# Patient Record
Sex: FEMALE | Race: BLACK OR AFRICAN AMERICAN | Employment: OTHER | URBAN - METROPOLITAN AREA
[De-identification: names, ages, dates, MRNs, and addresses within clinical notes are randomized per-mention and may not be internally consistent; named-entity substitution may affect disease eponyms.]

---

## 2017-03-19 ENCOUNTER — ANESTHESIA EVENT (OUTPATIENT)
Dept: SURGERY | Age: 62
End: 2017-03-19
Payer: MEDICAID

## 2017-03-20 ENCOUNTER — ANESTHESIA (OUTPATIENT)
Dept: SURGERY | Age: 62
End: 2017-03-20
Payer: MEDICAID

## 2017-03-20 ENCOUNTER — HOSPITAL ENCOUNTER (OUTPATIENT)
Age: 62
Setting detail: OUTPATIENT SURGERY
Discharge: HOME | End: 2017-03-20
Payer: MEDICAID

## 2017-03-20 VITALS
WEIGHT: 122.31 LBS | OXYGEN SATURATION: 100 % | RESPIRATION RATE: 19 BRPM | TEMPERATURE: 97.8 F | SYSTOLIC BLOOD PRESSURE: 120 MMHG | HEART RATE: 77 BPM | DIASTOLIC BLOOD PRESSURE: 57 MMHG

## 2017-03-20 LAB
ANION GAP BLD CALC-SCNC: 21 MMOL/L (ref 5–15)
ATRIAL RATE: 80 BPM
BUN BLD-MCNC: 63 MG/DL (ref 9–20)
CA-I BLD-MCNC: 1 MMOL/L (ref 1.12–1.32)
CALCULATED P AXIS, ECG09: 56 DEGREES
CALCULATED R AXIS, ECG10: 63 DEGREES
CALCULATED T AXIS, ECG11: -16 DEGREES
CHLORIDE BLD-SCNC: 106 MMOL/L (ref 98–107)
CO2 BLD-SCNC: 23 MMOL/L (ref 21–32)
CREAT BLD-MCNC: 9.5 MG/DL (ref 0.6–1.3)
DIAGNOSIS, 93000: NORMAL
GLUCOSE BLD-MCNC: 90 MG/DL (ref 65–105)
HCT VFR BLD CALC: 37 % (ref 35–47)
HGB BLD-MCNC: 12.6 GM/DL (ref 11.5–16)
P-R INTERVAL, ECG05: 228 MS
POTASSIUM BLD-SCNC: 4.9 MMOL/L (ref 3.5–5.1)
Q-T INTERVAL, ECG07: 430 MS
QRS DURATION, ECG06: 116 MS
QTC CALCULATION (BEZET), ECG08: 495 MS
SERVICE CMNT-IMP: ABNORMAL
SODIUM BLD-SCNC: 144 MMOL/L (ref 136–145)
VENTRICULAR RATE, ECG03: 80 BPM

## 2017-03-20 PROCEDURE — 74011250636 HC RX REV CODE- 250/636

## 2017-03-20 PROCEDURE — 76060000032 HC ANESTHESIA 0.5 TO 1 HR

## 2017-03-20 PROCEDURE — 77030020256 HC SOL INJ NACL 0.9%  500ML

## 2017-03-20 PROCEDURE — 77030003601 HC NDL NRV BLK BBMI -A

## 2017-03-20 PROCEDURE — 77030002986 HC SUT PROL J&J -A

## 2017-03-20 PROCEDURE — 93005 ELECTROCARDIOGRAM TRACING: CPT

## 2017-03-20 PROCEDURE — 80047 BASIC METABLC PNL IONIZED CA: CPT

## 2017-03-20 PROCEDURE — 76210000020 HC REC RM PH II FIRST 0.5 HR

## 2017-03-20 PROCEDURE — 74011000250 HC RX REV CODE- 250

## 2017-03-20 PROCEDURE — 74011250636 HC RX REV CODE- 250/636: Performed by: ANESTHESIOLOGY

## 2017-03-20 PROCEDURE — 77030018846 HC SOL IRR STRL H20 ICUM -A

## 2017-03-20 PROCEDURE — 76210000006 HC OR PH I REC 0.5 TO 1 HR

## 2017-03-20 PROCEDURE — 77030011640 HC PAD GRND REM COVD -A

## 2017-03-20 PROCEDURE — 77030031139 HC SUT VCRL2 J&J -A

## 2017-03-20 PROCEDURE — 77030032490 HC SLV COMPR SCD KNE COVD -B

## 2017-03-20 PROCEDURE — 76010000138 HC OR TIME 0.5 TO 1 HR

## 2017-03-20 RX ORDER — SODIUM CHLORIDE, SODIUM LACTATE, POTASSIUM CHLORIDE, CALCIUM CHLORIDE 600; 310; 30; 20 MG/100ML; MG/100ML; MG/100ML; MG/100ML
75 INJECTION, SOLUTION INTRAVENOUS CONTINUOUS
Status: DISCONTINUED | OUTPATIENT
Start: 2017-03-20 | End: 2017-03-20 | Stop reason: HOSPADM

## 2017-03-20 RX ORDER — ONDANSETRON 2 MG/ML
INJECTION INTRAMUSCULAR; INTRAVENOUS AS NEEDED
Status: DISCONTINUED | OUTPATIENT
Start: 2017-03-20 | End: 2017-03-20 | Stop reason: HOSPADM

## 2017-03-20 RX ORDER — SERTRALINE HYDROCHLORIDE 25 MG/1
12.5 TABLET, FILM COATED ORAL DAILY
COMMUNITY

## 2017-03-20 RX ORDER — METOPROLOL TARTRATE 25 MG/1
25 TABLET, FILM COATED ORAL 2 TIMES DAILY
COMMUNITY

## 2017-03-20 RX ORDER — BACITRACIN 500 [USP'U]/G
OINTMENT TOPICAL DAILY
COMMUNITY

## 2017-03-20 RX ORDER — MIDAZOLAM HYDROCHLORIDE 1 MG/ML
0.5 INJECTION, SOLUTION INTRAMUSCULAR; INTRAVENOUS
Status: DISCONTINUED | OUTPATIENT
Start: 2017-03-20 | End: 2017-03-20 | Stop reason: HOSPADM

## 2017-03-20 RX ORDER — GUAIFENESIN 100 MG/5ML
81 LIQUID (ML) ORAL DAILY
COMMUNITY

## 2017-03-20 RX ORDER — PROPOFOL 10 MG/ML
INJECTION, EMULSION INTRAVENOUS
Status: DISCONTINUED | OUTPATIENT
Start: 2017-03-20 | End: 2017-03-20 | Stop reason: HOSPADM

## 2017-03-20 RX ORDER — GLYCOPYRROLATE 0.2 MG/ML
INJECTION INTRAMUSCULAR; INTRAVENOUS AS NEEDED
Status: DISCONTINUED | OUTPATIENT
Start: 2017-03-20 | End: 2017-03-20 | Stop reason: HOSPADM

## 2017-03-20 RX ORDER — NITROGLYCERIN 0.3 MG/1
0.3 TABLET SUBLINGUAL
COMMUNITY

## 2017-03-20 RX ORDER — HYDROCODONE BITARTRATE AND ACETAMINOPHEN 7.5; 325 MG/1; MG/1
1 TABLET ORAL
Qty: 25 TAB | Refills: 0 | Status: SHIPPED | OUTPATIENT
Start: 2017-03-20

## 2017-03-20 RX ORDER — SODIUM CHLORIDE 0.9 % (FLUSH) 0.9 %
5-10 SYRINGE (ML) INJECTION AS NEEDED
Status: DISCONTINUED | OUTPATIENT
Start: 2017-03-20 | End: 2017-03-20 | Stop reason: HOSPADM

## 2017-03-20 RX ORDER — FAMOTIDINE 20 MG/1
20 TABLET, FILM COATED ORAL 2 TIMES DAILY
COMMUNITY

## 2017-03-20 RX ORDER — DIPHENHYDRAMINE HYDROCHLORIDE 50 MG/ML
12.5 INJECTION, SOLUTION INTRAMUSCULAR; INTRAVENOUS AS NEEDED
Status: DISCONTINUED | OUTPATIENT
Start: 2017-03-20 | End: 2017-03-20 | Stop reason: HOSPADM

## 2017-03-20 RX ORDER — SODIUM CHLORIDE 9 MG/ML
50 INJECTION, SOLUTION INTRAVENOUS CONTINUOUS
Status: DISCONTINUED | OUTPATIENT
Start: 2017-03-20 | End: 2017-03-20 | Stop reason: HOSPADM

## 2017-03-20 RX ORDER — LIDOCAINE HYDROCHLORIDE 10 MG/ML
INJECTION INFILTRATION; PERINEURAL AS NEEDED
Status: DISCONTINUED | OUTPATIENT
Start: 2017-03-20 | End: 2017-03-20 | Stop reason: HOSPADM

## 2017-03-20 RX ORDER — CALCIUM CARBONATE 500(1250)
2 TABLET ORAL DAILY
COMMUNITY

## 2017-03-20 RX ORDER — ACETAMINOPHEN 325 MG/1
650 TABLET ORAL
COMMUNITY

## 2017-03-20 RX ORDER — SODIUM CHLORIDE 9 MG/ML
25 INJECTION, SOLUTION INTRAVENOUS CONTINUOUS
Status: DISCONTINUED | OUTPATIENT
Start: 2017-03-20 | End: 2017-03-20 | Stop reason: HOSPADM

## 2017-03-20 RX ORDER — SODIUM CHLORIDE, SODIUM LACTATE, POTASSIUM CHLORIDE, CALCIUM CHLORIDE 600; 310; 30; 20 MG/100ML; MG/100ML; MG/100ML; MG/100ML
125 INJECTION, SOLUTION INTRAVENOUS CONTINUOUS
Status: DISCONTINUED | OUTPATIENT
Start: 2017-03-20 | End: 2017-03-20 | Stop reason: HOSPADM

## 2017-03-20 RX ORDER — DEXAMETHASONE SODIUM PHOSPHATE 4 MG/ML
INJECTION, SOLUTION INTRA-ARTICULAR; INTRALESIONAL; INTRAMUSCULAR; INTRAVENOUS; SOFT TISSUE AS NEEDED
Status: DISCONTINUED | OUTPATIENT
Start: 2017-03-20 | End: 2017-03-20 | Stop reason: HOSPADM

## 2017-03-20 RX ORDER — LIDOCAINE HYDROCHLORIDE 10 MG/ML
0.1 INJECTION, SOLUTION EPIDURAL; INFILTRATION; INTRACAUDAL; PERINEURAL AS NEEDED
Status: DISCONTINUED | OUTPATIENT
Start: 2017-03-20 | End: 2017-03-20 | Stop reason: HOSPADM

## 2017-03-20 RX ORDER — HYDROCODONE BITARTRATE AND ACETAMINOPHEN 5; 325 MG/1; MG/1
1 TABLET ORAL AS NEEDED
Status: DISCONTINUED | OUTPATIENT
Start: 2017-03-20 | End: 2017-03-20 | Stop reason: HOSPADM

## 2017-03-20 RX ORDER — FENTANYL CITRATE 50 UG/ML
50 INJECTION, SOLUTION INTRAMUSCULAR; INTRAVENOUS AS NEEDED
Status: DISCONTINUED | OUTPATIENT
Start: 2017-03-20 | End: 2017-03-20 | Stop reason: HOSPADM

## 2017-03-20 RX ORDER — SODIUM CHLORIDE 0.9 % (FLUSH) 0.9 %
5-10 SYRINGE (ML) INJECTION EVERY 8 HOURS
Status: DISCONTINUED | OUTPATIENT
Start: 2017-03-20 | End: 2017-03-20 | Stop reason: HOSPADM

## 2017-03-20 RX ORDER — KETOTIFEN FUMARATE 0.35 MG/ML
1 SOLUTION/ DROPS OPHTHALMIC
COMMUNITY

## 2017-03-20 RX ORDER — LEVETIRACETAM 500 MG/1
500 TABLET ORAL 2 TIMES DAILY
COMMUNITY

## 2017-03-20 RX ORDER — PROMETHAZINE HYDROCHLORIDE 12.5 MG/1
TABLET ORAL
COMMUNITY

## 2017-03-20 RX ORDER — HYDROMORPHONE HYDROCHLORIDE 1 MG/ML
0.2 INJECTION, SOLUTION INTRAMUSCULAR; INTRAVENOUS; SUBCUTANEOUS
Status: DISCONTINUED | OUTPATIENT
Start: 2017-03-20 | End: 2017-03-20 | Stop reason: HOSPADM

## 2017-03-20 RX ORDER — AMLODIPINE BESYLATE 5 MG/1
5 TABLET ORAL DAILY
COMMUNITY

## 2017-03-20 RX ORDER — ONDANSETRON 2 MG/ML
4 INJECTION INTRAMUSCULAR; INTRAVENOUS AS NEEDED
Status: DISCONTINUED | OUTPATIENT
Start: 2017-03-20 | End: 2017-03-20 | Stop reason: HOSPADM

## 2017-03-20 RX ORDER — MIDAZOLAM HYDROCHLORIDE 1 MG/ML
1 INJECTION, SOLUTION INTRAMUSCULAR; INTRAVENOUS AS NEEDED
Status: DISCONTINUED | OUTPATIENT
Start: 2017-03-20 | End: 2017-03-20 | Stop reason: HOSPADM

## 2017-03-20 RX ORDER — MORPHINE SULFATE 2 MG/ML
2 INJECTION, SOLUTION INTRAMUSCULAR; INTRAVENOUS
Status: DISCONTINUED | OUTPATIENT
Start: 2017-03-20 | End: 2017-03-20 | Stop reason: HOSPADM

## 2017-03-20 RX ORDER — FENTANYL CITRATE 50 UG/ML
25 INJECTION, SOLUTION INTRAMUSCULAR; INTRAVENOUS
Status: DISCONTINUED | OUTPATIENT
Start: 2017-03-20 | End: 2017-03-20 | Stop reason: HOSPADM

## 2017-03-20 RX ORDER — SEVELAMER CARBONATE FOR ORAL SUSPENSION 800 MG/1
1.6 POWDER, FOR SUSPENSION ORAL
COMMUNITY

## 2017-03-20 RX ORDER — BUDESONIDE 1 MG/2ML
1000 INHALANT ORAL
COMMUNITY

## 2017-03-20 RX ORDER — SODIUM CHLORIDE 9 MG/ML
INJECTION, SOLUTION INTRAVENOUS
Status: DISCONTINUED | OUTPATIENT
Start: 2017-03-20 | End: 2017-03-20 | Stop reason: HOSPADM

## 2017-03-20 RX ADMIN — SODIUM CHLORIDE: 9 INJECTION, SOLUTION INTRAVENOUS at 12:47

## 2017-03-20 RX ADMIN — GLYCOPYRROLATE 0.2 MG: 0.2 INJECTION INTRAMUSCULAR; INTRAVENOUS at 12:50

## 2017-03-20 RX ADMIN — SODIUM CHLORIDE 50 ML/HR: 900 INJECTION, SOLUTION INTRAVENOUS at 12:14

## 2017-03-20 RX ADMIN — FENTANYL CITRATE 50 MCG: 50 INJECTION, SOLUTION INTRAMUSCULAR; INTRAVENOUS at 12:19

## 2017-03-20 RX ADMIN — PROPOFOL 20 MCG/KG/MIN: 10 INJECTION, EMULSION INTRAVENOUS at 12:47

## 2017-03-20 RX ADMIN — DEXAMETHASONE SODIUM PHOSPHATE 4 MG: 4 INJECTION, SOLUTION INTRA-ARTICULAR; INTRALESIONAL; INTRAMUSCULAR; INTRAVENOUS; SOFT TISSUE at 12:58

## 2017-03-20 RX ADMIN — ONDANSETRON 4 MG: 2 INJECTION INTRAMUSCULAR; INTRAVENOUS at 12:58

## 2017-03-20 RX ADMIN — MIDAZOLAM HYDROCHLORIDE 1 MG: 1 INJECTION, SOLUTION INTRAMUSCULAR; INTRAVENOUS at 12:19

## 2017-03-20 NOTE — IP AVS SNAPSHOT
Summary of Care Report The Summary of Care report has been created to help improve care coordination. Users with access to [employer] or [address] (Web-based application) may access additional patient information including the Discharge Summary. If you are not currently a [address] user and need more information, please call the number listed below in the [place] [address] section and ask to be connected with Medical Records. Facility Information Name Address Phone [phone] [facility] 7 [phone] [phone] Patient Information Patient Name Sex  Chito Fritz ([id number]) Female 1955 Discharge Information Admitting Provider Service Area Unit Diane Meeks MD / Edward Shaikh / [phone] Discharge Provider Discharge Date/Time Discharge Disposition Destination (none) 3/20/2017 (Pending) SNF (none) Patient Language Language ENGLISH [13] You are allergic to the following Allergen Reactions Tomato Hives Ancef (Cefazolin) Unknown (comments) Nsaids (Non-Steroidal Anti-Inflammatory Drug) Unknown (comments) Current Discharge Medication List  
  
START taking these medications Dose & Instructions Dispensing Information Comments HYDROcodone-acetaminophen 7.5-325 mg per tablet Commonly known as:  Ed Pittman Dose:  1 Tab Take 1 Tab by mouth every four (4) hours as needed for Pain. Max Daily Amount: 6 Tabs. Quantity:  25 Tab Refills:  0 CONTINUE these medications which have NOT CHANGED Dose & Instructions Dispensing Information Comments  
 acetaminophen 325 mg tablet Commonly known as:  TYLENOL Dose:  650 mg Take 650 mg by mouth every four (4) hours as needed for Pain. Indications: Fever, Pain  Refills:  0  
   
 aspirin 81 mg chewable tablet Dose:  81 mg Take 81 mg by mouth daily. Indications: heart failure Refills:  0  
   
 bacitracin 500 unit/gram Oint Commonly known as:  BACITRACIN Apply  to affected area daily. Apply to upper right arm topically every day for altered skin integrity, clean area and apply ointment to area and leave uncovered Refills:  0  
   
 calcium carbonate 500 mg calcium (1,250 mg) tablet Commonly known as:  OS-FABI Dose:  2 Tab Take 2 Tabs by mouth daily. Refills:  0  
   
 famotidine 20 mg tablet Commonly known as:  PEPCID Dose:  20 mg Take 20 mg by mouth two (2) times a day. Indications: GASTROESOPHAGEAL REFLUX Refills:  0 HumuLIN R U-100 100 unit/mL injection Generic drug:  insulin regular  
 by SubCUTAneous route two (2) times a day. Sliding Scale: 0-149=0, 150-199= 2 units, 200-249= 4 units, 250-299= 6 units,  300-349= 8 units, 350-400= 10 units, Greater than 400 call MD, less than 70 call MD  
 Refills:  0 KEPPRA 500 mg tablet Generic drug:  levETIRAcetam  
 Dose:  500 mg Take 500 mg by mouth two (2) times a day. Indications: unspecified convulsion Refills:  0  
   
 ketotifen 0.025 % (0.035 %) ophthalmic solution Commonly known as:  ZADITOR Dose:  1 Drop Administer 1 Drop to both eyes every eight (8) hours as needed. Refills:  0  
   
 metoprolol tartrate 25 mg tablet Commonly known as:  LOPRESSOR Dose:  25 mg Take 25 mg by mouth two (2) times a day. Refills:  0  
   
 nitroglycerin 0.3 mg SL tablet Commonly known as:  NITROSTAT Dose:  0.3 mg  
0.3 mg by SubLINGual route every five (5) minutes as needed for Chest Pain. Refills:  0 NORVASC 5 mg tablet Generic drug:  amLODIPine Dose:  5 mg Take 5 mg by mouth daily. Refills:  0  
   
 promethazine 12.5 mg tablet Commonly known as:  PHENERGAN Take  by mouth every six (6) hours as needed for Nausea. Refills:  0 PULMICORT 1 mg/2 mL Nbsp Generic drug: budesonide Dose:  1000 mcg  
1,000 mcg by Nebulization route every twelve (12) hours as needed (for SOB). Refills:  0  
   
 sertraline 25 mg tablet Commonly known as:  ZOLOFT Dose:  12.5 mg Take 12.5 mg by mouth daily. Indications: depression Refills:  0  
   
 sevelamer carbonate 0.8 gram Pwpk oral powder Commonly known as:  Ehsan Beau Dose:  1.6 g Take 1.6 g by mouth Every Tuesday, Thursday and Saturday. Refills:  0 Surgery Information ID Date/Time Status Primary Surgeon All Procedures Location [id number] 3/20/2017 Scarlet Ríos MD REPAIR ANEURYSM RIGHT ARM FISTULA  [facility] MAIN OR Follow-up Information Follow up With Details Comments Contact Info None   None (395) Patient stated that they have no PCP Discharge Instructions Patient Discharge Instructions Trinidad Judy / [id number] : 1955 Admitted 3/20/2017 Discharged: 3/20/2017 Take Home Medications · It is important that you take the medication exactly as they are prescribed. · Keep your medication in the bottles provided by the pharmacist and keep a list of the medication names, dosages, and times to be taken in your wallet. · Do not take other medications without consulting your doctor. What to do at [facility] Recommended diet: Renal Diet, Recommended activity: Activity as tolerated, Additional Instructions: remove arm dressing on Wed and leave open Follow-up with Dr Dea Mccracken in 2 weeks, call [phone] for appt Information obtained by : 
I understand that if any problems occur once I am at home I am to contact my physician. I understand and acknowledge receipt of the instructions indicated above.   
 
                                                                                                                                     
Physician's or R.N.'s Signature Date/Time Patient or Representative Signature                                                          Date/Time Chart Review Routing History No Routing History on File

## 2017-03-20 NOTE — ANESTHESIA PROCEDURE NOTES
Peripheral Block    Start time: 3/20/2017 12:10 PM  End time: 3/20/2017 12:16 PM  Performed by: Yoselin Fernandez  Authorized by: Yoselin Fernandez       Pre-procedure:    Indications: at surgeon's request and post-op pain management    Preanesthetic Checklist: risks and benefits discussed, site marked and timeout performed      Block Type:   Block Type:  Supraclavicular  Monitoring:  Standard ASA monitoring, continuous pulse ox, frequent vital sign checks, heart rate, responsive to questions and oxygen  Injection Technique:  Single shot  Procedures: ultrasound guided and nerve stimulator    Patient Position: supine  Prep: betadine and povidone-iodine 7.5% surgical scrub    Location:  Supraclavicular  Needle Type:  Stimuplex  Needle Gauge:  22 G  Needle Localization:  Nerve stimulator and ultrasound guidance  Motor Response: minimal motor response >0.4 mA    Medication Injected:  1.5%  ropivacaine and mepivacaine  Volume (mL):  20    Assessment:  Number of attempts:  1  Injection Assessment:  Incremental injection every 5 mL, local visualized surrounding nerve on ultrasound, negative aspiration for blood, no intravascular symptoms, negative aspiration for CSF, no paresthesia and ultrasound image on chart  Patient tolerance:  Patient tolerated the procedure well with no immediate complications

## 2017-03-20 NOTE — DISCHARGE INSTRUCTIONS
Patient Discharge Instructions    Carlos Sharp / [id number] : 1955    Admitted 3/20/2017 Discharged: 3/20/2017     Take Home Medications            · It is important that you take the medication exactly as they are prescribed. · Keep your medication in the bottles provided by the pharmacist and keep a list of the medication names, dosages, and times to be taken in your wallet. · Do not take other medications without consulting your doctor. What to do at Home    Recommended diet: Renal Diet,     Recommended activity: Activity as tolerated,     Additional Instructions: remove arm dressing on Wed and leave open    Follow-up with Dr Dee Wilkins in 2 weeks, call [phone] for appt        Information obtained by :  I understand that if any problems occur once I am at home I am to contact my physician. I understand and acknowledge receipt of the instructions indicated above.                                                                                                                                            Physician's or R.N.'s Signature                                                                  Date/Time                                                                                                                                              Patient or Representative Signature                                                          Date/Time

## 2017-03-20 NOTE — ANESTHESIA POSTPROCEDURE EVALUATION
Post-Anesthesia Evaluation and Assessment    Patient: Alicja Parent MRN: [id number]  SSN: [id number]    YOB: 1955  Age: 64 y.o. Sex: female       Cardiovascular Function/Vital Signs  Visit Vitals    /57    Pulse 77    Temp 36.6 °C (97.8 °F)    Resp 19    Wt 55.5 kg (122 lb 5 oz)    SpO2 100%       Patient is status post general, regional anesthesia for Procedure(s):  REPAIR ANEURYSM RIGHT ARM FISTULA . Nausea/Vomiting: None    Postoperative hydration reviewed and adequate. Pain:  Pain Scale 1: Numeric (0 - 10) (03/20/17 1204)  Pain Intensity 1: 0 (03/20/17 1204)   Managed    Neurological Status: At baseline    Mental Status and Level of Consciousness: Arousable    Pulmonary Status:   O2 Device: Nasal cannula (03/20/17 1346)   Adequate oxygenation and airway patent    Complications related to anesthesia: None    Post-anesthesia assessment completed.  No concerns    Signed By: Silvano Franklin MD     March 20, 2017

## 2017-03-20 NOTE — PERIOP NOTES
Report called to [facility] facility regarding pt.'s discharge instructions and prescription. Patient awaiting medical transport back to the facility.

## 2017-03-20 NOTE — ANESTHESIA PREPROCEDURE EVALUATION
Anesthetic History   No history of anesthetic complications            Review of Systems / Medical History  Patient summary reviewed, nursing notes reviewed and pertinent labs reviewed    Pulmonary  Within defined limits                 Neuro/Psych   Within defined limits           Cardiovascular  Within defined limits  Hypertension          CAD         GI/Hepatic/Renal  Within defined limits              Endo/Other  Within defined limits  Diabetes         Other Findings              Physical Exam    Airway  Mallampati: II  TM Distance: > 6 cm  Neck ROM: normal range of motion   Mouth opening: Normal     Cardiovascular  Regular rate and rhythm,  S1 and S2 normal,  no murmur, click, rub, or gallop             Dental  No notable dental hx       Pulmonary  Breath sounds clear to auscultation               Abdominal  GI exam deferred       Other Findings            Anesthetic Plan    ASA: 3  Anesthesia type: general and regional - supraclavicular block

## 2017-03-20 NOTE — IP AVS SNAPSHOT
[address] 
[phone] Patient: David Donahue MRN: [id number] XEC:[id number] You are allergic to the following Allergen Reactions Tomato Hives Ancef (Cefazolin) Unknown (comments) Nsaids (Non-Steroidal Anti-Inflammatory Drug) Unknown (comments) Recent Documentation Weight OB Status Smoking Status 55.5 kg Hysterectomy Never Smoker About your hospitalization You were admitted on:  March 20, 2017 You last received care in the:  [facility] PACU You were discharged on:  March 20, 2017 Unit phone number:  [phone] Why you were hospitalized Your primary diagnosis was:  Not on File Providers Seen During Your Hospitalizations Provider Role Specialty Primary office phone Lucrecia Rosa MD Attending Provider Vascular Surgery [phone] Your Primary Care Physician (PCP) Primary Care Physician Office Phone Office Fax NONE ** None ** ** None ** Follow-up Information Follow up With Details Comments Contact Info None   None (395) Patient stated that they have no PCP Current Discharge Medication List  
  
START taking these medications Dose & Instructions Dispensing Information Comments Morning Noon Evening Bedtime HYDROcodone-acetaminophen 7.5-325 mg per tablet Commonly known as:  Yi Minor Your last dose was: Your next dose is:    
   
   
 Dose:  1 Tab Take 1 Tab by mouth every four (4) hours as needed for Pain. Max Daily Amount: 6 Tabs. Quantity:  25 Tab Refills:  0 CONTINUE these medications which have NOT CHANGED Dose & Instructions Dispensing Information Comments Morning Noon Evening Bedtime  
 acetaminophen 325 mg tablet Commonly known as:  TYLENOL Your last dose was:     
   
Your next dose is:    
   
   
 Dose:  650 mg Take 650 mg by mouth every four (4) hours as needed for Pain. Indications: Fever, Pain Refills:  0  
     
   
   
   
  
 aspirin 81 mg chewable tablet Your last dose was: Your next dose is:    
   
   
 Dose:  81 mg Take 81 mg by mouth daily. Indications: heart failure Refills:  0  
     
   
   
   
  
 bacitracin 500 unit/gram Oint Commonly known as:  BACITRACIN Your last dose was: Your next dose is:    
   
   
 Apply  to affected area daily. Apply to upper right arm topically every day for altered skin integrity, clean area and apply ointment to area and leave uncovered Refills:  0  
     
   
   
   
  
 calcium carbonate 500 mg calcium (1,250 mg) tablet Commonly known as:  OS-FABI Your last dose was: Your next dose is:    
   
   
 Dose:  2 Tab Take 2 Tabs by mouth daily. Refills:  0  
     
   
   
   
  
 famotidine 20 mg tablet Commonly known as:  PEPCID Your last dose was: Your next dose is:    
   
   
 Dose:  20 mg Take 20 mg by mouth two (2) times a day. Indications: GASTROESOPHAGEAL REFLUX Refills:  0 HumuLIN R U-100 100 unit/mL injection Generic drug:  insulin regular Your last dose was: Your next dose is:    
   
   
 by SubCUTAneous route two (2) times a day. Sliding Scale: 0-149=0, 150-199= 2 units, 200-249= 4 units, 250-299= 6 units,  300-349= 8 units, 350-400= 10 units, Greater than 400 call MD, less than 70 call MD  
 Refills:  0 KEPPRA 500 mg tablet Generic drug:  levETIRAcetam  
   
Your last dose was: Your next dose is:    
   
   
 Dose:  500 mg Take 500 mg by mouth two (2) times a day. Indications: unspecified convulsion Refills:  0  
     
   
   
   
  
 ketotifen 0.025 % (0.035 %) ophthalmic solution Commonly known as:  ZADITOR Your last dose was: Your next dose is:    
   
   
 Dose:  1 Drop Administer 1 Drop to both eyes every eight (8) hours as needed. Refills:  0  
     
   
   
   
  
 metoprolol tartrate 25 mg tablet Commonly known as:  LOPRESSOR Your last dose was: Your next dose is:    
   
   
 Dose:  25 mg Take 25 mg by mouth two (2) times a day. Refills:  0  
     
   
   
   
  
 nitroglycerin 0.3 mg SL tablet Commonly known as:  NITROSTAT Your last dose was: Your next dose is:    
   
   
 Dose:  0.3 mg  
0.3 mg by SubLINGual route every five (5) minutes as needed for Chest Pain. Refills:  0 NORVASC 5 mg tablet Generic drug:  amLODIPine Your last dose was: Your next dose is:    
   
   
 Dose:  5 mg Take 5 mg by mouth daily. Refills:  0  
     
   
   
   
  
 promethazine 12.5 mg tablet Commonly known as:  PHENERGAN Your last dose was: Your next dose is: Take  by mouth every six (6) hours as needed for Nausea. Refills:  0 PULMICORT 1 mg/2 mL Nbsp Generic drug:  budesonide Your last dose was: Your next dose is:    
   
   
 Dose:  1000 mcg  
1,000 mcg by Nebulization route every twelve (12) hours as needed (for SOB). Refills:  0  
     
   
   
   
  
 sertraline 25 mg tablet Commonly known as:  ZOLOFT Your last dose was: Your next dose is:    
   
   
 Dose:  12.5 mg Take 12.5 mg by mouth daily. Indications: depression Refills:  0  
     
   
   
   
  
 sevelamer carbonate 0.8 gram Pwpk oral powder Commonly known as:  Brian De La Garza Your last dose was: Your next dose is:    
   
   
 Dose:  1.6 g Take 1.6 g by mouth Every Tuesday, Thursday and Saturday. Refills:  0 Where to Get Your Medications Information on where to get these meds will be given to you by the nurse or doctor. ! Ask your nurse or doctor about these medications   HYDROcodone-acetaminophen 7.5-325 mg per tablet Discharge Instructions Patient Discharge Instructions Merline Holt / [id number] : 1955 Admitted 3/20/2017 Discharged: 3/20/2017 Take Home Medications · It is important that you take the medication exactly as they are prescribed. · Keep your medication in the bottles provided by the pharmacist and keep a list of the medication names, dosages, and times to be taken in your wallet. · Do not take other medications without consulting your doctor. What to do at [facility] Recommended diet: Renal Diet, Recommended activity: Activity as tolerated, Additional Instructions: remove arm dressing on Wed and leave open Follow-up with Dr Moni Ragsdale in 2 weeks, call [phone] for appt Information obtained by : 
I understand that if any problems occur once I am at home I am to contact my physician. I understand and acknowledge receipt of the instructions indicated above. Physician's or R.N.'s Signature                                                                  Date/Time Patient or Representative Signature                                                          Date/Time Discharge Orders None Introducing [facility]! [employer] introduces [employer] patient portal. Now you can access parts of your medical record, email your doctor's office, and request medication refills online. 1. In your internet browser, go to [email or web address] 2. Click on the First Time User? Click Here link in the Sign In box. You will see the New Member Sign Up page. 3. Enter your [employer] Access Code exactly as it appears below.  You will not need to use this code after youve completed the sign-up process. If you do not sign up before the expiration date, you must request a new code. · [employer] Access Code: [email or web address] Expires: 6/13/2017 10:32 AM 
 
4. Enter the last four digits of your Social Security Number (xxxx) and Date of Birth (mm/dd/yyyy) as indicated and click Submit. You will be taken to the next sign-up page. 5. Create a [employer] ID. This will be your [employer] login ID and cannot be changed, so think of one that is secure and easy to remember. 6. Create a [employer] password. You can change your password at any time. 7. Enter your Password Reset Question and Answer. This can be used at a later time if you forget your password. 8. Enter your e-mail address. You will receive e-mail notification when new information is available in [address] 9. Click Sign Up. You can now view and download portions of your medical record. 10. Click the Download Summary menu link to download a portable copy of your medical information. If you have questions, please visit the Frequently Asked Questions section of the [employer] website. Remember, [employer] is NOT to be used for urgent needs. For medical emergencies, dial 911. Now available from your iPhone and Android! General Information Please provide this summary of care documentation to your next provider. Patient Signature:  ____________________________________________________________ Date:  ____________________________________________________________  
  
Mata Cristobal Provider Signature:  ____________________________________________________________ Date:  ____________________________________________________________

## 2017-03-20 NOTE — OP NOTES
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 PROCEDURES PERFORMED: Revision right upper arm fistula with   repair of aneurysm. SURGEON: Calixto Pope MD.    ANESTHESIA: Regional block. ESTIMATED BLOOD LOSS: Minimal.    SPECIMENS REMOVED: None. COMPLICATIONS: None. INDICATIONS: The patient is a 35-year-old female with end-stage   renal disease on hemodialysis. She has a functioning right upper arm   fistula. The arterial end of the fistula is dilated and aneurysmal with   compromise of the overlying skin. This portion of the fistula will be   excised and repaired. She has a tunneled catheter that is being used   for dialysis. DESCRIPTION OF PROCEDURE: The patient's right arm was   prepped and draped. A longitudinal incision was made over the   aneurysmal portion of the fistula to include excision of pink thin-walled   section of the skin with an overlying scabbed area. At the arterial end   of the fistula, the fistula was dissected free down to more normal   caliber vessel. It was encircled with a vessel loop. Dissection was then   continued in the mid portion of the fistula at the outflow end of the   aneurysmal segment. The fistula was encircled at this level. It was then   clamped at both ends and opened longitudinally. Prior to this, the   fistula had been dissected free, nearly circumferentially except for the   posterior portion. A significant portion of the circumference of the   fistula was excised and the fistula was reapproximated basically doing   an aneurysmorrhaphy.  Running 4-0 Prolene was used to We closed the fistula. Following this, flow was restored. The incision was then   closed with running Vicryl subcutaneous suture and Vicryl subcuticular   skin closure. Dressings were applied and the patient was returned to   the recovery room in stable condition.         MD Jay Buchanan / Celina Mckinney   D:  03/20/2017   13:41   T:  03/20/2017   14:39   Job #:  [id number]

## 2017-03-20 NOTE — ROUTINE PROCESS
Patient: Cyndee Baugh MRN: [id number]  SSN: [id number]   YOB: 1955  Age: 64 y.o. Sex: female     Patient is status post Procedure(s):  REPAIR ANEURYSM RIGHT ARM FISTULA .     Surgeon(s) and Role:     * Sharif Rivera MD - Primary    Local/Dose/Irrigation:  See [facility]                      Hemodialysis Catheter (Active)                       Dressing/Packing:  Wound Arm Right-DRESSING TYPE: 4 x 4;Other (Comment) (medipore tape) (03/20/17 1300)  Splint/Cast:  ]    Other:

## 2017-03-20 NOTE — BRIEF OP NOTE
BRIEF OPERATIVE NOTE    Date of Procedure: 3/20/2017   Preoperative Diagnosis: ESRD  Postoperative Diagnosis: ESRD    Procedure(s):  REPAIR ANEURYSM RIGHT ARM FISTULA   Surgeon(s) and Role:     * Ella Trinidad MD - Primary            Surgical Staff:  Circ-1: Ebonie Geller RN  Scrub RN-1: Juan Alejandro RN  Surg Asst-1: Kalen Mcgregor  Event Time In   Incision Start 1300   Incision Close 1335     Anesthesia: Regional   Estimated Blood Loss: minimal  Specimens: * No specimens in log *   Findings: none   Complications: none  Implants: * No implants in log *

## (undated) DEVICE — REM POLYHESIVE ADULT PATIENT RETURN ELECTRODE: Brand: VALLEYLAB

## (undated) DEVICE — HANDLE LT SNAP ON ULT DURABLE LENS FOR TRUMPF ALC DISPOSABLE

## (undated) DEVICE — GAUZE SPONGES,12 PLY: Brand: CURITY

## (undated) DEVICE — KENDALL SCD EXPRESS SLEEVES, KNEE LENGTH, MEDIUM: Brand: KENDALL SCD

## (undated) DEVICE — Device

## (undated) DEVICE — (D)PREP SKN CHLRAPRP APPL 26ML -- CONVERT TO ITEM 371833

## (undated) DEVICE — SUT PROL 4-0 36IN RB1 DA BLU --

## (undated) DEVICE — DEVON™ KNEE AND BODY STRAP 60" X 3" (1.5 M X 7.6 CM): Brand: DEVON

## (undated) DEVICE — DRAPE,REIN 53X77,STERILE: Brand: MEDLINE

## (undated) DEVICE — STERILE POLYISOPRENE POWDER-FREE SURGICAL GLOVES WITH EMOLLIENT COATING: Brand: PROTEXIS

## (undated) DEVICE — SUTURE VCRL SZ 3-0 L27IN ABSRB UD L26MM SH 1/2 CIR J416H

## (undated) DEVICE — Z INACTIVE NO USAGE TURNOVER KIT RM CLEANOP

## (undated) DEVICE — SOLUTION IV 500ML 0.9% SOD CHL FLX CONT

## (undated) DEVICE — SOLUTION IRRIG 1000ML H2O STRL BLT